# Patient Record
(demographics unavailable — no encounter records)

---

## 2025-02-04 NOTE — HISTORY OF PRESENT ILLNESS
[FreeTextEntry1] : ---- 41 Y/O  LMP 25 HERE FOR CHECK UP; LMP 25;PT C/O HEAVY PERIODS AND CHANGING PADS HOURLY. PMHX;/ANEMIA PSHX;/BREAST BX SOCIAL HX;-ETOH -CIGG 4 PER DAY  ADVISED TO QUIT STD;   /        STD PREVENTION DISCUSSED FAMILY HISTORY OF BREAST CANCER;O REVIEW OF SYMPTOMS DONE; ALLERGIES; pt answered NKDA Medication reconciliation was completed by reviewing, with the patient's involvement, the patient's current outpatient medications and those  ordered for the patient today.           PE;BREASTS;- MASSES DC NODES SBE ABDOMEN;SOFT NT ND NL GENITALIA VAGINA -DC CERVIX;-CMT UTERUS TOP ;NL SIZE NT AV ADNEXA; NT - MASSES   A;P;ANNUAL EXAM, HEAVY PERIODS -PAP GC CHLAMYDIA -YOLA UP TO DATE -HORMONAL PANEL -CBC -SONO -HYSTERSCOPY -ANSWERED QUESTIONS.

## 2025-02-18 NOTE — HISTORY OF PRESENT ILLNESS
[FreeTextEntry1] : ---- 39 Y/O  HERE FOR HYSTEROSCOPY;INFORMED CONSENT OBTAINED RBA DISCUSSED;PT C/O HEAVY PERIODS AND CHANGING PADS HOURLY. PMHX;/ANEMIA HGB 9.1 PSHX;/BREAST BX SOCIAL HX;-ETOH -CIGG 4 PER DAY  ADVISED TO QUIT STD;   /        STD PREVENTION DISCUSSED FAMILY HISTORY OF BREAST CANCER;O REVIEW OF SYMPTOMS DONE; ALLERGIES; pt answered NKDA Medication reconciliation was completed by reviewing, with the patient's involvement, the patient's current outpatient medications and those  ordered for the patient today.           PE; ABDOMEN;SOFT NT ND NL GENITALIA VAGINA -DC CERVIX;-CMT UTERUS TOP ;NL SIZE NT AV ADNEXA; NT - MASSES  -TIME OUT DONE.  EBL LESS THAN 5CC -IN OFFICE HYSTEROSCOPY DONE WITHOUT INCIDENT; CX DIALTED ,UTERUS SOUNDED TO  8, -BOTH OSTIA SEEN; NL APPERING ENDOMETRIAL CAVITY;- MASSES - POLYPS -ENDO BX DONE WITHOUT INCIDENT -I/O MONITORED THROUGHOUT THE PROCEDURE -PT TOLERATED THE PROCEDURE WELL -INSTRUCTIONS REVIEWED -RTC 2 WEEKS.

## 2025-03-11 NOTE — HISTORY OF PRESENT ILLNESS
[FreeTextEntry1] : ---- 39 Y/O  HERE FOR F-U AFTER HYSTEROSCOPY;PATH REVIEWED;BENIGN PT C/O HEAVY PERIODS AND CHANGING PADS HOURLY. PMHX;/ANEMIA HGB 9.1 PSHX;/BREAST BX SOCIAL HX;-ETOH -CIGG 4 PER DAY  ADVISED TO QUIT STD;   /        STD PREVENTION DISCUSSED FAMILY HISTORY OF BREAST CANCER;O REVIEW OF SYMPTOMS DONE; ALLERGIES; pt answered NKDA Medication reconciliation was completed by reviewing, with the patient's involvement, the patient's current outpatient medications and those  ordered for the patient today.           PE; ABDOMEN;SOFT NT ND NL GENITALIA VAGINA -DC CERVIX;-CMT UTERUS TOP ;NL SIZE NT AV ADNEXA; NT - MASSES  A;P HEAVY MENSES S/P BX;PATH REVIEWED -MEDICAL VS SURGICA VS CONSERVATIVE OPTIOS DISCUSSED -PT TO CONSIDER OPTIONS -ANSWERED QUESTIONS